# Patient Record
(demographics unavailable — no encounter records)

---

## 2025-01-27 NOTE — HISTORY OF PRESENT ILLNESS
[FreeTextEntry1] : 90 yo male with DM type 2, atrial flutter/fibrillation (on Eliquis), CAD (non-obstructive per 7/17/24 cath), and severe aortic stenosis (per 1/24/24 echo) -> TAVR (29 mm Colmenares Julee) 7/17/24 and Medtronic Micra pacemaker 7/19/24 at [facility]. Patient presents today for follow-up. Patient denies chest pain, dyspnea, palpitations, syncope, edema, melena, hematochezia, or hematemesis. Patient reports episode of transient vision loss in his left eye in late 10/2024, which he described as "black curtain coming down" and resolved after 5 minutes. He had a similar episode but involving his right eye 2 weeks later. He currently denies any vision loss. He has been compliant with his Eliquis 5 mg po bid. However, he reports not bruising easily like he used to in the past while on Eliquis.  Primary: Renzo Reddy ([employer], ph: [phone], fax: [phone])

## 2025-01-27 NOTE — ASSESSMENT
[FreeTextEntry1] : 89 yo male with DM type 2, atrial flutter/fibrillation (on Eliquis), CAD (non-obstructive per 7/17/24 cath), and severe aortic stenosis (per 1/24/24 echo) -> TAVR (29 mm Colmenares Julee) 7/17/24 and Medtronic Micra pacemaker 7/19/24 at [facility].  ECG today demonstrated ventricular paced rhythm with ventricular couplet.  Patient is currently clinically stable with regard to atrial flutter/fibrillation/TAVR/leadless pacemaker implanted 7/2024.  Echo on 1/14/25 demonstrated normal TAVR function.  [id number] = 3. Will continue Eliquis 5 mg po bid. However, given 2 reported episodes of amaurosis fugax (1st in left eye, 2nd in right eye) in late 10/2024, will resume aspirin 81 mg po daily. Patient was advised to see his ophthalmologist for further evaluation. Patient is currently in V-paced rhythm. Patient with home monitor presumably being followed by device clinic at [facility].  Patient with non-obstructive CAD per 7/17/24 cardiac cath. Will continue to monitor clinically at this time.

## 2025-01-27 NOTE — PHYSICAL EXAM
[Normal Rate] : normal [Rhythm Regular] : regular [Normal S1] : normal S1 [Normal S2] : normal S2 [No Murmur] : no murmurs heard [No Pitting Edema] : no pitting edema present [Normal] : clear lung fields, good air entry, no respiratory distress [No Edema] : no edema [No Cyanosis] : no cyanosis [No Clubbing] : no clubbing [Moves all extremities] : moves all extremities [No Focal Deficits] : no focal deficits [Normal Speech] : normal speech [Alert and Oriented] : alert and oriented [Right Carotid Bruit] : no bruit heard over the right carotid [Left Carotid Bruit] : no bruit heard over the left carotid

## 2025-01-27 NOTE — CARDIOLOGY SUMMARY
[de-identified] : 1/27/25 ECG: Ventricular paced rhythm, rate 89 bpm, ventricular couplet 7/31/24 ECG: Ventricular paced rhythm, rate 98 bpm 3/14/24 ECG: Atrial flutter, rate 74 bpm, PVC 12/28/23 ECG: Atrial flutter, rate 74 bpm, PVCs, nonspecific ST abnormalities [de-identified] : \par  3/24/23 Lexiscan Myoview (at [facility]): No CP or ischemic ECG changes. No ischemic/infarct per myocardial perfusion imaging. Normal LV wall motion, LVEF 62%.\par   [de-identified] : 1/14/25 Echo: Normal LV size and systolic function, LVEF 51%. Indeterminate diastolic function. Normal RV size and systolic function. Mildly dilated LA. Dilated RA. TAVR with normal function. Mild posterior MAC. Mild MR. Dilated aortic root (4 cm). Dilated ascending aorta (3.9 cm).  7/17/24 Echo (at ): Mildly reduced LV systolic function, LVEF 45-50%. Mild to mod LVH. Normal RV size and systolic function. Biatrial enlargement. TAVR without AR. Mild TR. PASP 20 mmHg.  1/24/24 Echo: Normal LV systolic function, LVEF > 55%. Indeterminate diastolic function. Severely dilated LA. Dilated ascending aorta (3.9 cm). Severe aortic stenosis (PARKER 0.73 cm2, mean gradient 51 mmHg, VTI ratio 0.14). Trace MR.  [de-identified] : 7/17/24 Cardiac cath (at ): Mild luminal irregularities in prox LAD. 40% mid LAD. 70-80% focal distal LAD stenosis. 60% prox LCx (after OM1). 40% prox OM1. 40-50% prox RCA, 70% mid RCA. Luminal irreg in distal RCA. Mild to mod disease in rPDA and RPL. Underwent TAVR with 29 mm Colmenares Julee valve and left femoral transvenous pacer due to slow aflutter and widening QRS post deployment of TAVR. [de-identified] : 12/19/24 Carotid US (at [facility]): No significant hemodynamic stenosis bilaterally.

## 2025-01-27 NOTE — CARDIOLOGY SUMMARY
[de-identified] : 1/27/25 ECG: Ventricular paced rhythm, rate 89 bpm, ventricular couplet 7/31/24 ECG: Ventricular paced rhythm, rate 98 bpm 3/14/24 ECG: Atrial flutter, rate 74 bpm, PVC 12/28/23 ECG: Atrial flutter, rate 74 bpm, PVCs, nonspecific ST abnormalities [de-identified] : \par  3/24/23 Lexiscan Myoview (at [facility]): No CP or ischemic ECG changes. No ischemic/infarct per myocardial perfusion imaging. Normal LV wall motion, LVEF 62%.\par   [de-identified] : 1/14/25 Echo: Normal LV size and systolic function, LVEF 51%. Indeterminate diastolic function. Normal RV size and systolic function. Mildly dilated LA. Dilated RA. TAVR with normal function. Mild posterior MAC. Mild MR. Dilated aortic root (4 cm). Dilated ascending aorta (3.9 cm).  7/17/24 Echo (at ): Mildly reduced LV systolic function, LVEF 45-50%. Mild to mod LVH. Normal RV size and systolic function. Biatrial enlargement. TAVR without AR. Mild TR. PASP 20 mmHg.  1/24/24 Echo: Normal LV systolic function, LVEF > 55%. Indeterminate diastolic function. Severely dilated LA. Dilated ascending aorta (3.9 cm). Severe aortic stenosis (PARKER 0.73 cm2, mean gradient 51 mmHg, VTI ratio 0.14). Trace MR.  [de-identified] : 7/17/24 Cardiac cath (at ): Mild luminal irregularities in prox LAD. 40% mid LAD. 70-80% focal distal LAD stenosis. 60% prox LCx (after OM1). 40% prox OM1. 40-50% prox RCA, 70% mid RCA. Luminal irreg in distal RCA. Mild to mod disease in rPDA and RPL. Underwent TAVR with 29 mm Colmenares Julee valve and left femoral transvenous pacer due to slow aflutter and widening QRS post deployment of TAVR. [de-identified] : 12/19/24 Carotid US (at [facility]): No significant hemodynamic stenosis bilaterally.

## 2025-01-27 NOTE — REVIEW OF SYSTEMS
[Knee Pain] : knee pain [Negative] : Heme/Lymph [Vomiting] : no vomiting [Heartburn] : no heartburn [Dysphagia] : no dysphagia [Diarrhea] : diarrhea [Blood in stool] : no blood in stoo

## 2025-01-27 NOTE — HISTORY OF PRESENT ILLNESS
[FreeTextEntry1] : 90 yo male with DM type 2, atrial flutter/fibrillation (on Eliquis), CAD (non-obstructive per 7/17/24 cath), and severe aortic stenosis (per 1/24/24 echo) -> TAVR (29 mm Colmenares Julee) 7/17/24 and Medtronic Micra pacemaker 7/19/24 at [facility]. Patient presents today for follow-up. Patient denies chest pain, dyspnea, palpitations, syncope, edema, melena, hematochezia, or hematemesis. Patient reports episode of transient vision loss in his left eye in late 10/2024, which he described as "black curtain coming down" and resolved after 5 minutes. He had a similar episode but involving his right eye 2 weeks later. He currently denies any vision loss. He has been compliant with his Eliquis 5 mg po bid. However, he reports not bruising easily like he used to in the past while on Eliquis.  Primary: Renzo Reddy ([employer], ph: [phone], fax: [phone])

## 2025-01-27 NOTE — ASSESSMENT
[FreeTextEntry1] : 89 yo male with DM type 2, atrial flutter/fibrillation (on Eliquis), CAD (non-obstructive per 7/17/24 cath), and severe aortic stenosis (per 1/24/24 echo) -> TAVR (29 mm Colmenares Julee) 7/17/24 and Medtronic Micra pacemaker 7/19/24 at [facility].  ECG today demonstrated ventricular paced rhythm with ventricular couplet.  Patient is currently clinically stable with regard to atrial flutter/fibrillation/TAVR/leadless pacemaker implanted 7/2024.  Echo on 1/14/25 demonstrated normal TAVR function.  [id number] = 3. Will continue Eliquis 5 mg po bid. However, given 2 reported episodes of amaurosis fugax (1st in left eye, 2nd in right eye) in late 10/2024, will resume aspirin 81 mg po daily. Patient was advised to see his ophthalmologist for further evaluation. Patient is currently in V-paced rhythm. Patient with home monitor presumably being followed by device clinic at [facility].  Patient with non-obstructive CAD per 7/17/24 cardiac cath. Will continue to monitor clinically at this time.

## 2025-05-15 NOTE — HISTORY OF PRESENT ILLNESS
[FreeTextEntry1] : 91 yo male with DM type 2, atrial flutter/fibrillation (on Eliquis), CAD (non-obstructive per 7/17/24 cath), and severe aortic stenosis (per 1/24/24 echo) -> TAVR (29 mm Colmenares Julee) 7/17/24, Medtronic Micra pacemaker 7/19/24, and history of transient vision loss in his left eye in late 10/2024 -> right eye 2 weeks later -> left eye on 4/8/25 and recently evaluated by ophthalmologist. Patient reports that he has been his taking his Eliquis without fail but has not been taking his aspirin consistently which was prescribed at his 1/27/25 office visit. Patient denies chest pain, dyspnea, palpitations, syncope, edema, melena, hematochezia, or hematemesis.  Primary: Renzo Reddy ([employer], ph: [phone], fax: [phone])

## 2025-05-15 NOTE — HISTORY OF PRESENT ILLNESS
[FreeTextEntry1] : 91 yo male with DM type 2, atrial flutter/fibrillation (on Eliquis), CAD (non-obstructive per 7/17/24 cath), and severe aortic stenosis (per 1/24/24 echo) -> TAVR (29 mm Colmenares Julee) 7/17/24, Medtronic Micra pacemaker 7/19/24, and history of transient vision loss in his left eye in late 10/2024 -> right eye 2 weeks later -> left eye on 4/8/25 and recently evaluated by ophthalmologist. Patient reports that he has been his taking his Eliquis without fail but has not been taking his aspirin consistently which was prescribed at his 1/27/25 office visit. Patient denies chest pain, dyspnea, palpitations, syncope, edema, melena, hematochezia, or hematemesis.  Primary: Renzo Reddy ([facility], ph: [phone], fax: [phone])

## 2025-05-15 NOTE — CARDIOLOGY SUMMARY
[de-identified] : 5/14/25 ECG: Ventricular paced rhythm, rate 88 bpm. Underlying rhythm is atrial fibrillation with PVCs. 1/27/25 ECG: Ventricular paced rhythm, rate 89 bpm, ventricular couplet 7/31/24 ECG: Ventricular paced rhythm, rate 98 bpm 3/14/24 ECG: Atrial flutter, rate 74 bpm, PVC 12/28/23 ECG: Atrial flutter, rate 74 bpm, PVCs, nonspecific ST abnormalities [de-identified] : \par  3/24/23 Lexiscan Myoview (at [facility]): No CP or ischemic ECG changes. No ischemic/infarct per myocardial perfusion imaging. Normal LV wall motion, LVEF 62%.\par   [de-identified] : 1/14/25 Echo: Normal LV size and systolic function, LVEF 51%. Indeterminate diastolic function. Normal RV size and systolic function. Mildly dilated LA. Dilated RA. TAVR with normal function. Mild posterior MAC. Mild MR. Dilated aortic root (4 cm). Dilated ascending aorta (3.9 cm).  7/17/24 Echo (at ): Mildly reduced LV systolic function, LVEF 45-50%. Mild to mod LVH. Normal RV size and systolic function. Biatrial enlargement. TAVR without AR. Mild TR. PASP 20 mmHg.  1/24/24 Echo: Normal LV systolic function, LVEF > 55%. Indeterminate diastolic function. Severely dilated LA. Dilated ascending aorta (3.9 cm). Severe aortic stenosis (PARKER 0.73 cm2, mean gradient 51 mmHg, VTI ratio 0.14). Trace MR.  [de-identified] : 7/17/24 Cardiac cath (at ): Mild luminal irregularities in prox LAD. 40% mid LAD. 70-80% focal distal LAD stenosis. 60% prox LCx (after OM1). 40% prox OM1. 40-50% prox RCA, 70% mid RCA. Luminal irreg in distal RCA. Mild to mod disease in rPDA and RPL. Underwent TAVR with 29 mm Colmenares Julee valve and left femoral transvenous pacer due to slow aflutter and widening QRS post deployment of TAVR. [de-identified] : 12/19/24 Carotid US (at [facility]): No significant hemodynamic stenosis bilaterally.

## 2025-05-15 NOTE — ASSESSMENT
[FreeTextEntry1] : 89 yo male with DM type 2, atrial flutter/fibrillation (on Eliquis), CAD (non-obstructive per 7/17/24 cath), and severe aortic stenosis (per 1/24/24 echo) -> TAVR (29 mm Colmenares Julee) 7/17/24, Medtronic Micra pacemaker 7/19/24, and history of transient vision loss in his left eye in late 10/2024 -> right eye 2 weeks later -> left eye on 4/8/25. ECG today demonstrated ventricular paced rhythm with underlying rhythm of atrial fibrillation and PVCs.  Patient is currently clinically stable with regard to atrial flutter/fibrillation/TAVR/leadless pacemaker implanted 7/2024.  Echo on 1/14/25 demonstrated normal TAVR function.  [id number] = 3. Will continue Eliquis 5 mg po bid. However, given now 3 reported episodes of amaurosis fugax (1st in left eye, 2nd in right eye) in late 10/2024 and left eye again 4/8/25, patient was strongly advised to resume his aspirin 81 mg po daily. Will defer SHANTELLE at this time for further evaluation given history of Zenker's diverticulum for which was supposed to undergo surgical correction. However, if patient has recurrence of vision loss, will have to reconsider SHANTELLE for further evaluation of cardiac source.  Patient is currently in V-paced rhythm.  Patient with home monitor which is being followed by device clinic at [facility].  Patient with non-obstructive CAD per 7/17/24 cardiac cath. Will continue to monitor clinically at this time.

## 2025-05-15 NOTE — ASSESSMENT
[FreeTextEntry1] : 91 yo male with DM type 2, atrial flutter/fibrillation (on Eliquis), CAD (non-obstructive per 7/17/24 cath), and severe aortic stenosis (per 1/24/24 echo) -> TAVR (29 mm Colmenares Julee) 7/17/24, Medtronic Micra pacemaker 7/19/24, and history of transient vision loss in his left eye in late 10/2024 -> right eye 2 weeks later -> left eye on 4/8/25. ECG today demonstrated ventricular paced rhythm with underlying rhythm of atrial fibrillation and PVCs.  Patient is currently clinically stable with regard to atrial flutter/fibrillation/TAVR/leadless pacemaker implanted 7/2024.  Echo on 1/14/25 demonstrated normal TAVR function.  [id number] = 3. Will continue Eliquis 5 mg po bid. However, given now 3 reported episodes of amaurosis fugax (1st in left eye, 2nd in right eye) in late 10/2024 and left eye again 4/8/25, patient was strongly advised to resume his aspirin 81 mg po daily. Will defer SHANTELLE at this time for further evaluation given history of Zenker's diverticulum for which was supposed to undergo surgical correction. However, if patient has recurrence of vision loss, will have to reconsider SHANTELLE for further evaluation of cardiac source.  Patient is currently in V-paced rhythm.  Patient with home monitor which is being followed by device clinic at [facility].  Patient with non-obstructive CAD per 7/17/24 cardiac cath. Will continue to monitor clinically at this time.

## 2025-05-15 NOTE — CARDIOLOGY SUMMARY
[de-identified] : 5/14/25 ECG: Ventricular paced rhythm, rate 88 bpm. Underlying rhythm is atrial fibrillation with PVCs. 1/27/25 ECG: Ventricular paced rhythm, rate 89 bpm, ventricular couplet 7/31/24 ECG: Ventricular paced rhythm, rate 98 bpm 3/14/24 ECG: Atrial flutter, rate 74 bpm, PVC 12/28/23 ECG: Atrial flutter, rate 74 bpm, PVCs, nonspecific ST abnormalities [de-identified] : \par  3/24/23 Lexiscan Myoview (at [facility]): No CP or ischemic ECG changes. No ischemic/infarct per myocardial perfusion imaging. Normal LV wall motion, LVEF 62%.\par   [de-identified] : 1/14/25 Echo: Normal LV size and systolic function, LVEF 51%. Indeterminate diastolic function. Normal RV size and systolic function. Mildly dilated LA. Dilated RA. TAVR with normal function. Mild posterior MAC. Mild MR. Dilated aortic root (4 cm). Dilated ascending aorta (3.9 cm).  7/17/24 Echo (at ): Mildly reduced LV systolic function, LVEF 45-50%. Mild to mod LVH. Normal RV size and systolic function. Biatrial enlargement. TAVR without AR. Mild TR. PASP 20 mmHg.  1/24/24 Echo: Normal LV systolic function, LVEF > 55%. Indeterminate diastolic function. Severely dilated LA. Dilated ascending aorta (3.9 cm). Severe aortic stenosis (PARKER 0.73 cm2, mean gradient 51 mmHg, VTI ratio 0.14). Trace MR.  [de-identified] : 7/17/24 Cardiac cath (at ): Mild luminal irregularities in prox LAD. 40% mid LAD. 70-80% focal distal LAD stenosis. 60% prox LCx (after OM1). 40% prox OM1. 40-50% prox RCA, 70% mid RCA. Luminal irreg in distal RCA. Mild to mod disease in rPDA and RPL. Underwent TAVR with 29 mm Colmenares Julee valve and left femoral transvenous pacer due to slow aflutter and widening QRS post deployment of TAVR. [de-identified] : 12/19/24 Carotid US (at [facility]): No significant hemodynamic stenosis bilaterally.